# Patient Record
(demographics unavailable — no encounter records)

---

## 2025-01-28 NOTE — PHYSICAL EXAM
[Normal Sclera/Conjunctiva] : normal sclera/conjunctiva [EOMI] : extraocular movements intact [Normal] : normal rate, regular rhythm, normal S1 and S2 and no murmur heard [No Varicosities] : no varicosities [No Edema] : there was no peripheral edema [Soft] : abdomen soft [Non Tender] : non-tender [Non-distended] : non-distended [No HSM] : no HSM [Normal Bowel Sounds] : normal bowel sounds [Normal Posterior Cervical Nodes] : no posterior cervical lymphadenopathy [Normal Anterior Cervical Nodes] : no anterior cervical lymphadenopathy [Grossly Normal Strength/Tone] : grossly normal strength/tone [Coordination Grossly Intact] : coordination grossly intact [No Focal Deficits] : no focal deficits [Normal Gait] : normal gait [Normal Affect] : the affect was normal [Normal Insight/Judgement] : insight and judgment were intact [de-identified] : Do not appreciate significant lesion on back of neck as patient describes [de-identified] : noted area of darker pigmentation on R ankle;

## 2025-01-28 NOTE — HISTORY OF PRESENT ILLNESS
[FreeTextEntry1] : multiple complaints, CPE [de-identified] : Complains of a soft tissue lump on the back of his neck and tingling down his arm when he moves around at his desk  odd pressing sensation at the top of his head - mostly at night, and mostly when he smokes marijuana that are always in the same spot and go away after he relaxes  Has an area of darker pigmentation on R leg that has been present for many years; he is unsure if it has changed in character  He complains of tightness in chest, which is often relieved by belching or defecation  thinks he has sleep apnea - 2-3 times a year wakes up gasping for air  He has tinnitis in his R ear which does not particularly bother him  Had been on lexapro 6 years ago, now with significant mental health concerns

## 2025-01-28 NOTE — REVIEW OF SYSTEMS
[FreeTextEntry2] : as described in HPI [FreeTextEntry4] : as described in HPI [FreeTextEntry7] : as described in HPI [FreeTextEntry9] : as described in HPI [de-identified] : as described in HPI

## 2025-01-28 NOTE — REVIEW OF SYSTEMS
[FreeTextEntry2] : as described in HPI [FreeTextEntry4] : as described in HPI [FreeTextEntry7] : as described in HPI [FreeTextEntry9] : as described in HPI [de-identified] : as described in HPI

## 2025-01-28 NOTE — HEALTH RISK ASSESSMENT
[Yes] : Yes [2 - 4 times a month (2 pts)] : 2-4 times a month (2 points) [1 or 2 (0 pts)] : 1 or 2 (0 points) [Never (0 pts)] : Never (0 points) [2] : 1) Little interest or pleasure doing things for more than half of the days (2) [3] : 2) Feeling down, depressed, or hopeless for nearly every day (3) [PHQ-2 Negative - No further assessment needed] : PHQ-2 Negative - No further assessment needed [1/2 of Days or More (2)] : 1.) Little interest or pleasure in doing things? Half the days or more [Nearly Every Day (3)] : 7.) Trouble concentrating on things, such as reading a newspaper or watching television? Nearly every day [Not at All (0)] : 8.) Moving or speaking so slowly that other people could have noticed, or the opposite, moving or speaking faster than usual? Not at all [Several Days (1)] : 9.) Thoughts that you would be off dead or of hurting yourself in some way? Several days [Moderately Severe] : Severity of Depression is Moderately Severe [Not at all] : How difficult have these problems made it for you to do your work, take care of things at home, or get along with people? Not at all [PHQ-9 Positive] : PHQ-9 Positive [Former] : Former [Patient reported colonoscopy was normal] : Patient reported colonoscopy was normal [] :  [# Of Children ___] : has [unfilled] children [I have developed a follow-up plan documented below in the note.] : I have developed a follow-up plan documented below in the note. [Time Spent: ___ Minutes] : I spent [unfilled] minutes performing a depression screening for this patient. [Audit-CScore] : 2 [de-identified] : stationary bike 60 minutes 5-6 days a week [de-identified] : shops at [employer], eats whole foods [EEK8Edqot] : 0 [[id number]] : 16 [de-identified] : smoked veyr infrequently in late teens; now smokes marijuana intermittently [ColonoscopyDate] : 2023 [ColonoscopyComments] : polyp removed - unknown followup interval [FreeTextEntry2] : between jobs -

## 2025-01-28 NOTE — HISTORY OF PRESENT ILLNESS
[FreeTextEntry1] : multiple complaints, CPE [de-identified] : Complains of a soft tissue lump on the back of his neck and tingling down his arm when he moves around at his desk  odd pressing sensation at the top of his head - mostly at night, and mostly when he smokes marijuana that are always in the same spot and go away after he relaxes  Has an area of darker pigmentation on R leg that has been present for many years; he is unsure if it has changed in character  He complains of tightness in chest, which is often relieved by belching or defecation  thinks he has sleep apnea - 2-3 times a year wakes up gasping for air  He has tinnitis in his R ear which does not particularly bother him  Had been on lexapro 6 years ago, now with significant mental health concerns

## 2025-01-28 NOTE — HEALTH RISK ASSESSMENT
[Yes] : Yes [2 - 4 times a month (2 pts)] : 2-4 times a month (2 points) [1 or 2 (0 pts)] : 1 or 2 (0 points) [Never (0 pts)] : Never (0 points) [2] : 1) Little interest or pleasure doing things for more than half of the days (2) [3] : 2) Feeling down, depressed, or hopeless for nearly every day (3) [PHQ-2 Negative - No further assessment needed] : PHQ-2 Negative - No further assessment needed [1/2 of Days or More (2)] : 1.) Little interest or pleasure in doing things? Half the days or more [Nearly Every Day (3)] : 7.) Trouble concentrating on things, such as reading a newspaper or watching television? Nearly every day [Not at All (0)] : 8.) Moving or speaking so slowly that other people could have noticed, or the opposite, moving or speaking faster than usual? Not at all [Several Days (1)] : 9.) Thoughts that you would be off dead or of hurting yourself in some way? Several days [Moderately Severe] : Severity of Depression is Moderately Severe [Not at all] : How difficult have these problems made it for you to do your work, take care of things at home, or get along with people? Not at all [PHQ-9 Positive] : PHQ-9 Positive [Former] : Former [Patient reported colonoscopy was normal] : Patient reported colonoscopy was normal [] :  [# Of Children ___] : has [unfilled] children [I have developed a follow-up plan documented below in the note.] : I have developed a follow-up plan documented below in the note. [Time Spent: ___ Minutes] : I spent [unfilled] minutes performing a depression screening for this patient. [Audit-CScore] : 2 [de-identified] : stationary bike 60 minutes 5-6 days a week [de-identified] : shops at [employer], eats whole foods [AKE9Sstsb] : 0 [[id number]] : 16 [de-identified] : smoked veyr infrequently in late teens; now smokes marijuana intermittently [ColonoscopyDate] : 2023 [ColonoscopyComments] : polyp removed - unknown followup interval [FreeTextEntry2] : between jobs -

## 2025-01-28 NOTE — PHYSICAL EXAM
[Normal Sclera/Conjunctiva] : normal sclera/conjunctiva [EOMI] : extraocular movements intact [Normal] : normal rate, regular rhythm, normal S1 and S2 and no murmur heard [No Varicosities] : no varicosities [No Edema] : there was no peripheral edema [Soft] : abdomen soft [Non Tender] : non-tender [Non-distended] : non-distended [No HSM] : no HSM [Normal Bowel Sounds] : normal bowel sounds [Normal Posterior Cervical Nodes] : no posterior cervical lymphadenopathy [Normal Anterior Cervical Nodes] : no anterior cervical lymphadenopathy [Grossly Normal Strength/Tone] : grossly normal strength/tone [Coordination Grossly Intact] : coordination grossly intact [No Focal Deficits] : no focal deficits [Normal Gait] : normal gait [Normal Affect] : the affect was normal [Normal Insight/Judgement] : insight and judgment were intact [de-identified] : Do not appreciate significant lesion on back of neck as patient describes [de-identified] : noted area of darker pigmentation on R ankle;

## 2025-02-18 NOTE — HISTORY OF PRESENT ILLNESS
[Never] : never [TextBox_4] : 40 year old with recent diagnosis of HTN and family hx of JUNAID presenting for evaluation of possible JUNAID. States that he does snore and intermittently does feel tired during the day. He has noticed apnea symptoms a couple times. Denies headaches during the day and does at times feel tired during the day but not always. Mom was diagnosed with JUNAID but does not treat it. No frequent night awakens or difficulty concentrating. [ESS] : 11

## 2025-02-18 NOTE — ASSESSMENT
[FreeTextEntry1] : 40 year old with recent hx of HTN and family hx of JUNAID presenting for evaluation.   Data reviewed:   Snoring Daytime sleepiness HTN  Patient does have clinical signs and symptoms compatible with JUNAID. Discussed medical risk factors associated with moderate to severe JUNAID including HTN, CAD, stroke among others. Discussed treatment options including PAP therapy, oral appliance therapy, and inspire. Plan to obtain sleep study.  - Sleep study ordered

## 2025-03-01 NOTE — HISTORY OF PRESENT ILLNESS
[FreeTextEntry1] : NP - dark spots [de-identified] : Casey Rosas 41y/o M presents for # dark spot on rt ankle -present for >20 years per patient, doesn't think changing, asx -family encouraged him to get evaluated   #bumps on back and posterior head -come and go, has been occurring over last year -mostly asx -has applied OTC anti-biotic to them in the past, unsure if helps  PHx of skin cancer: no FHx of skin cancer: no H/x of blistering sunburns: no H/x of tanning bed use: no Uses sunscreen regularly: yes

## 2025-03-01 NOTE — HISTORY OF PRESENT ILLNESS
11/24/2021    HRA (Health Risk Assessment) done, reviewed with patient and documented in the chart.    HISTORY OF PRESENT ILLNESS:  Baldemar Bashir presents for his Annual Wellness VisitF/U hyperlipidemia, HTN, prostate cancer diagnosed this year. No treatments at this time.    Has had the COVID and influenza vaccines    he  has complaints or concerns which include chronic left foot pain that radiates into the left medial calf.  Fractured his ankle greater than 1 year ago, continues to have intermittent discomfort.  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  Initially had some relief, but states he now has chronic discomfort.  Does not note erythema or edema.  No paresthesias.    No chest pain, no palpitations, no dyspnea..    No opiod use    PROVIDERS:   STALIN Weir, APC PCP  GENEVA Machuca, Podiatry  Dr. TITO Luu, Urology  Dr. MIMI Zelaya, Cardiology  Dr. SIMEON Bocanegra, DDS  Dr. SIMEON Morales. Optometry      Weight is down 10 #  VITAL SIGNS  Visit Vitals  /84 (BP Location: LUE - Left upper extremity, Patient Position: Sitting, Cuff Size: Large Adult)   Pulse 94   Ht 6' (1.829 m)   Wt 110.2 kg (243 lb)   BMI 32.96 kg/m²     110.2 kg (243 lb)  6' (1.829 m)  Body mass index is 32.96 kg/m².    HEARING AND VISION:   Hearing Screening    125Hz 250Hz 500Hz 1000Hz 2000Hz 3000Hz 4000Hz 6000Hz 8000Hz   Right ear:            Left ear:            Comments: Bilat finger test passed     Visual Acuity Screening    Right eye Left eye Both eyes   Without correction:   20 30   With correction:          RISK FACTORS:  · Depression:  denies  · Functional Ability: no cane nor walker  · Level Of Safety including hearing impairment: does not feel he has any significant hearing loss  · ADL's (Activities of Daily Living): Independent , does drive   · Falls:  Fell Feb 2021 on ice , no falls since  · Home:  2 story home, with his wife, able to maintain his home    PROBLEM LIST:  Patient Active Problem List   Diagnosis   • Mixed  [FreeTextEntry1] : NP - dark spots hyperlipidemia   • Essential hypertension   • Prediabetes   • Abnormal EKG   • GERD (gastroesophageal reflux disease)   • Asymptomatic PVCs   • Obesity (BMI 30-39.9)   • Elevated PSA       PAST MEDICAL HISTORY:      Hypertension                                                  Elevated blood sugar                                          High cholesterol                                              Elevated PSA                                                  SURGICAL HISTORY:       FOOT FRACTURE SURGERY                           02/28/2018      Comment: ORIF left fibula fracture Dr Maykel Machuca    MEDICATIONS:  Current Outpatient Medications   Medication Sig   • atorvastatin (LIPITOR) 80 MG tablet Take 1 tablet by mouth daily.   • hydrochlorothiazide (MICROZIDE) 12.5 MG capsule Take 1 capsule by mouth daily.   • losartan (COZAAR) 50 MG tablet Take 2 tablets by mouth daily. Indications: High Blood Pressure Disorder   • VITAMIN D PO      No current facility-administered medications for this visit.       [facility] [place]    ALLERGIES   ALLERGIES:   Allergen Reactions   • Grass Other (See Comments)     Seasonal allergies   • Simvastatin MYALGIA       FAMILY HISTORY:    Family History   Problem Relation Age of Onset   • High blood pressure Father    • Cancer Father 50        Bladder, bone. Dead 80's   • Other Mother         Heart disease, dead 52   • Patient is unaware of any medical problems Sister    • Patient is unaware of any medical problems Brother    • Hypertension Brother    • Patient is unaware of any medical problems Daughter    • Patient is unaware of any medical problems Son    • Patient is unaware of any medical problems Son    • Patient is unaware of any medical problems Son         HEALTH CARE MAINTENANCE:  Immunizations, screening tests, and routine follow-up testing related to the patient medical history were reviewed in detail.  Needed items are as per  [de-identified] : Casey Rosas 39y/o M presents for # dark spot on rt ankle -present for >20 years per patient, doesn't think changing, asx -family encouraged him to get evaluated   #bumps on back and posterior head -come and go, has been occurring over last year -mostly asx -has applied OTC anti-biotic to them in the past, unsure if helps  PHx of skin cancer: no FHx of skin cancer: no H/x of blistering sunburns: no H/x of tanning bed use: no Uses sunscreen regularly: yes the order summary.    SOCIAL HISTORY:  Social History     Socioeconomic History   • Marital status: /Civil Union     Spouse name: Yadi   • Number of children: 4   • Years of education: Not on file   • Highest education level: Not on file   Occupational History   • Occupation: , Retired     Comment: [employer]   Tobacco Use   • Smoking status: Never Smoker   • Smokeless tobacco: Never Used   Substance and Sexual Activity   • Alcohol use: Yes     Alcohol/week: 1.0 standard drink     Types: 1 Cans of beer per week   • Drug use: No   • Sexual activity: Yes     Partners: Female   Other Topics Concern   •  Service Yes     Comment: [employer] [id number]   • Blood Transfusions No   • Caffeine Concern No   • Occupational Exposure Not Asked   • Hobby Hazards Not Asked   • Sleep Concern Not Asked   • Stress Concern Not Asked   • Weight Concern Not Asked   • Special Diet Not Asked   • Back Care Not Asked   • Exercise Yes     Comment: exercise 5-6 days a week   • Bike Helmet Not Asked   • Seat Belt Yes   • Self-Exams Not Asked   Social History Narrative   • Not on file     Social Determinants of Health     Financial Resource Strain:    • Social Determinants: Financial Resource Strain: Not on file   Food Insecurity:    • Social Determinants: Food Insecurity: Not on file   Transportation Needs:    • Lack of Transportation (Medical): Not on file   • Lack of Transportation (Non-Medical): Not on file   Physical Activity:    • Days of Exercise per Week: Not on file   • Minutes of Exercise per Session: Not on file   Stress:    • Social Determinants: Stress: Not on file   Social Connections:    • Social Determinants: Social Connections: Not on file   Intimate Partner Violence:    • Social Determinants: Intimate Partner Violence Past Fear: Not on file   • Social Determinants: Intimate Partner Violence Current Fear: Not on file       DRUG HISTORY   reports no history of drug use.    Questionnaire as  completed above addressing hearing , Depression , ADLs , Functional Ability, Home Safety reviewed at length.     Diet is reviewed with the patient and reveals: he tries to follow an appropriate diet on a consistent basis.    End of Life:he does not  have a HCPOA (Health Care Power-of-) completed and in place.     Exercise:  is exercising regularly.    REVIEW OF SYSTEMS: The patient denies symptoms of:     General: fever, chills, night sweats, fatigue, weight loss, weight gain and decreased appetite  Skin: rash, itching, lumps or bumps or moles that are changing, hair changes and nail changes  Eyes: visual blurring, double vision, spots before the eyes and eye pain  Ears: decreased auditory acuity, ringing or tinnitus in the ears, pain in the ears and discharge from the ears  Nose: nose bleed, discharge from the nose and decrease in ability to smell  Mouth: soreness of the mouth or tongue or lips and abnormality of the teeth or gums  Throat: sore throat and hoarseness or voice change. No dysphagia.  Neck: stiffness or pain in the neck  Cardiorespiratory: chest pain, edema, cough, hemoptysis, wheeze and shortness of breath  Gastrointestinal: abdominal pain, nausea, vomiting, constipation, diarrhea, black tarry stools, blood in the stools, change in the bowel habits, or heartburn and indigestion  Genitourinary: urgency, frequency, dysuria, hematuria and nocturia  No change in urinary stream.  Neurologic: headache, weakness, loss of sensation, visual disturbance, trouble with balance or coordination and loss of memory  Musculoskeletal:  Positive for left foot pain is noted above  Psychiatric: symptoms of depression, feeling sad or blue. No anxiety.  Breasts: No breast masses, no dimpling or puckering. No nipple discharge.    1.) Do you have an Advance directive, living will, or power of  for health care document that contains your wishes for end of life care?: No     6 a.) How many servings of Fruits and  Vegetables do you have each day ( 1 serving = 1 piece of fruit, 1/2 cup fruits or vegetables): 1 per day     6 b.) How many servings of High Fiber / Whole Grain Foods to you have each day ( 1 serving = 1 cup cold cereal, 1/2 cup cooked cereal, 1 slice bread): 1 per day     6 d.) How many servings of Sugar Sweetened Beverages do you have each day ( 1 serving = 1 can or 12 oz cup of sode or juice): 2 per day     7a.) Have you had a fall in the past year?: Yes     10.) How often do you have trouble taking medicines the way you have been told to take them?: Sometimes I take my prescribed medications         PHYSICAL EXAMINATION:    Constitutional:  Well-developed, well-nourished, no acute distress, non-toxic appearance.   Eyes:  PERRL (Pupils equal, round, reactive to light), conjunctivae normal.  HENT:  PERRL, external ears normal, nose normal, oropharynx moist, no pharyngeal exudates. Neck- normal range of motion, no tenderness, supple.  Normal dentition.   Neck: Normal range of motion. No tenderness. Supple. No stridor.  No carotid bruits on auscultation. No thyromegaly, no masses palpated.  Respiratory:  No respiratory distress, normal breath sounds, no rales, no wheezing.   Cardiovascular:  Normal rate, normal rhythm, no murmurs, no gallops, no rubs.   Gastrointestinal:  Soft, non-distended, normal bowel sounds, nontender, no organomegaly, no mass, no rebound, no guarding.  Genitourinary:  No costovertebral angle tenderness.  Musculoskeletal:  No edema, no tenderness, no deformities. Back- no tenderness.  Integument:  Well-hydrated, no rash, no unusual moles.  Lymphatic:  No lymphadenopathy noted.   Neurologic:  Alert and oriented times 3, CN (Cranial nerves) 2-12 normal, normal motor function, normal sensory function, no focal deficits noted.   Psychiatric:  Speech and behavior appropriate. No short term or long term memory deficits  Genitalia: Normal male external genitalia.  No rashes or lesions. No hernias.  Penis is circumcised. Descended bilaterally, no testicular masses tolerated.   Anus: Normal sphincter tone. Normal prostate. No masses palpated.   Breast: Symmetric. No dimpling or puckering. No nipple discharge. No masses.    Patient Active Problem List   Component Date Value Ref Range Status   • Case Report 04/06/2021    Final                    Value:Surgical Pathology                                Case: [id number]                                 Authorizing Provider:  Jacky Macias MD          Collected:           04/06/2021 1226              Ordering Location:     [facility]  Received:            04/06/2021 1517              Pathologist:           Bruce Hartman MD                                                     Specimens:   A) - Prostate, Right Lateral Base; 2 specimens per jar                                              B) - Prostate, Right Lateral Mid; 2 specimens per jar                                               C) - Prostate, Right Lateral [place]; 2 specimens per jar                                              D) - Prostate, Left Lateral Base; 2 specimens per jar                                               E) - Prostate, Left Lateral Mid; 2 specimens per jar                                                F) - Prostate, Left Lateral [place]; 2 specimens per jar                                     • Pathologic Diagnosis 04/06/2021    Final                    Value:This result contains rich text formatting which cannot be displayed here.   • Clinical Information 04/06/2021    Final                    Value:This result contains rich text formatting which cannot be displayed here.   • Gross Description 04/06/2021    Final                    Value:This result contains rich text formatting which cannot be displayed here.   • Microscopic Description 04/06/2021    Final                    Value:This result contains rich text formatting which cannot be displayed here.   • Disclaimer  04/06/2021    Final                    Value:This result contains rich text formatting which cannot be displayed here.       ASSESSMENT:     ED Diagnosis   1. Welcome to Medicare preventive visit     2. Mixed hyperlipidemia  LIPID PANEL WITH REFLEX   3. Essential hypertension  CBC WITH DIFFERENTIAL    COMPREHENSIVE METABOLIC PANEL   4. Prediabetes  GLYCOHEMOGLOBIN   5. Obesity (BMI 30-39.9)     6. Elevated PSA  PSA   7. Chronic pain in left foot  XR FOOT 3+ VW LEFT    URIC ACID   8. Need for vaccination  PNEUMOCOCCAL POLYSACCHARIDE ADULT VACC, IM/SQ (PNEUMOVAX 23)   Plan 1. Preventive issues reviewed.  Continue to exercise on a regular basis.    Encouraged to follow through with the Shingrix vaccine.  2. Labs pending, once I have the results further recommendations will be made.  Atorvastatin 80 mg daily.  Repeat lipids 1 year   3. Losartan 50 mg 2 tablets a day, hydrochlorothiazide 12.5 mg a day.  Limit salt intake.  Regular exercise.  Encouraged to check his blood pressure on occasion.  4. Lab work is pending.  Congratulated on his weight loss.  Encouraged to limit carbohydrates.  Exercise routinely  5. His goal is to lose another 10 lb  6. Continue to follow with urology  7. X-rays pending, uric acid level pending.  Once I have the results will discuss further recommendation, discussed referral back to Podiatry.      Body mass index is 32.96 kg/m².    BMI ASSESSMENT/PLAN:  Patient is obese.    See patient education in AVS         Recent Review Flowsheet Data     Date 11/22/2021    Adult PHQ 2 Score 0    Adult PHQ 2 Interpretation No further screening needed    Little interest or pleasure in activity? 0    Feeling down, depressed or hopeless? 0       View Complete Flowsheet ...More Data Exists          DEPRESSION ASSESSMENT/PLAN:  Depression screening is negative no further plan needed.

## 2025-03-01 NOTE — ASSESSMENT
[FreeTextEntry1] : #Dermatitis, leaving behind #scarring -New diagnosis, unc prognosis -On back, concern for autoimmune blistering like p foliaceous or SCLE/DLE given scarring left behind -Disc can mix clobetasol with mupirocin but will defer until bx results, biopsies as below  # Neoplasm of uncertain behavior R mid back x2 for H&E and DIF Location: R mid back DDx: R/o pemphigus foliaceous vs SCLE/DLE vs other Biopsy by 4 mm punch  The risks/benefits/alternatives of skin biopsy were explained to the patient, which include and are not limited to bleeding, infection, scarring or discoloration of skin, and recurrence of lesion. Patient expressed understanding of these risks and provided consent to the procedure. Time out with verification of patient and lesion site was performed. Site was prepped with rubbing alcohol, lidocaine with epinephrine was injected for anesthesia, and biopsy was performed. Specimen sent to path.  Procedure was without complication and well tolerated. Wound care was discussed.  f/u in 2 weeks for suture removal  # Neoplasm of uncertain behavior Location: Right lower leg DDx: PIH/hemosiderin deposition vs less likely Kaposi vs other Biopsy by 4 mm punch  The risks/benefits/alternatives of skin biopsy were explained to the patient, which include and are not limited to bleeding, infection, scarring or discoloration of skin, and recurrence of lesion. Patient expressed understanding of these risks and provided consent to the procedure. Time out with verification of patient and lesion site was performed. Site was prepped with rubbing alcohol, lidocaine with epinephrine was injected for anesthesia, and biopsy was performed. Specimen sent to path.  Procedure was without complication and well tolerated. Wound care was discussed.  f/u in 2 weeks for suture removal

## 2025-03-01 NOTE — PHYSICAL EXAM
[FreeTextEntry3] : Hyperpigmented discontiguous patch R lower leg Several round scars on back, one eroded round patch with overlying flaky scale Hyperpigmented round patch left nasal ala

## 2025-03-17 NOTE — ASSESSMENT
[FreeTextEntry1] : #Dermatitis, leaving behind scarring -New diagnosis, unc prognosis -Bx last visit showing ulcer likely 2/2 excoriation, DIF negative -Pt to RTC when flaring without scratching for re-biopsy when active  #PIH R ankle -Supported by bx, reassurance, RTC for any changes  RTC prn

## 2025-03-17 NOTE — HISTORY OF PRESENT ILLNESS
[FreeTextEntry1] : RP - dark spots [de-identified] : Casey Rosas 39y/o M presents for # dark spot on rt ankle -present for >20 years per patient, doesn't think changing, asx -bx consistent with PIH  #bumps on back and posterior head -come and go, has been occurring over last year -mostly asx -has applied OTC anti-biotic to them in the past, unsure if helps -bx showing ulcer and epidermal necrosis 2/2 excoriation, DIF neg  PHx of skin cancer: no FHx of skin cancer: no H/x of blistering sunburns: no H/x of tanning bed use: no Uses sunscreen regularly: yes

## 2025-03-17 NOTE — HISTORY OF PRESENT ILLNESS
[FreeTextEntry1] : RP - dark spots [de-identified] : Casey Rosas 39y/o M presents for # dark spot on rt ankle -present for >20 years per patient, doesn't think changing, asx -bx consistent with PIH  #bumps on back and posterior head -come and go, has been occurring over last year -mostly asx -has applied OTC anti-biotic to them in the past, unsure if helps -bx showing ulcer and epidermal necrosis 2/2 excoriation, DIF neg  PHx of skin cancer: no FHx of skin cancer: no H/x of blistering sunburns: no H/x of tanning bed use: no Uses sunscreen regularly: yes

## 2025-04-14 NOTE — HISTORY OF PRESENT ILLNESS
[FreeTextEntry1] : f/u  [de-identified] : had biopsies at dermatology  started [facility] for therapy - weekly appointments, which have been helping, but he is still very nervous/anxious and he would like to go back on lexapro  accepted new job which has improved his mental health.  Has been using omeprazole, which has improved the constant chest discomfort and belching, but he still experiences some intermittent SOB, especially when climbing stairs, and shooting discomfort in his neck/left arm which is concerning to him.

## 2025-04-14 NOTE — PHYSICAL EXAM
[Normal Sclera/Conjunctiva] : normal sclera/conjunctiva [EOMI] : extraocular movements intact [Normal] : normal rate, regular rhythm, normal S1 and S2 and no murmur heard [Coordination Grossly Intact] : coordination grossly intact [No Focal Deficits] : no focal deficits [Normal Gait] : normal gait [Normal Affect] : the affect was normal [Normal Insight/Judgement] : insight and judgment were intact Impaired gait

## 2025-06-23 NOTE — HISTORY OF PRESENT ILLNESS
[de-identified] : CC: tinnitus    HISTORY OF PRESENT ILLNESS: SARKIS MATUTE is a 41 year old male who presents today with tinnitus x many years referred by his PCP Dr. Rubi he believes he has had R sided tinnitus since he was a child. it is exacerbated by loud places. he also reports frequent ear popping and muffled hearing this is his first time seeking treatment for it  denies otalgia, otorrhea, dizziness, bruxism, clenching, allergies, nasal symptoms  he has a history of depression - currently on escitalopram    REVIEW OF SYSTEMS: General ROS: negative for - chills, fatigue or fever Psychological ROS: negative for - anxiety or depression Ophthalmic ROS: negative for - blurry vision, decreased vision or double vision ENT ROS: negative except as noted from HPI Allergy and Immunology ROS: negative except as noted from HPI Hematological and Lymphatic ROS: negative for - bleeding problems Endocrine ROS: negative for - malaise/lethargy Respiratory ROS: negative for - stridor Cardiovascular ROS: negative for - chest pain Gastrointestinal ROS: negative for - appetite loss or nausea/vomiting Genitourinary ROS: negative for - incontinence Musculoskeletal ROS: negative for - gait disturbance Neurological ROS: negative for - behavioral changes Dermatological ROS: negative for - nail changes   Physical Exam:   GENERAL APPEARANCE: Well-developed and No Acute Distress. COMMUNICATION: Able to Communicate. Strong Voice.   HEAD AND FACE Eyes: Testing of ocular motility including primary gaze alignment normal. Inspection and Appearance: No evidence of lesions or masses Palpation: Palpation of the face reveals no sinus tenderness Salivary Glands: Symmetric without masses Facial Strength: Symmetric without evidence of facial paralysis   EAR, NOSE, MOUTH, and THROAT: Ear Canals and Tympanic Membranes, Bilateral: No evidence of inflammation or lesions. Thresholds: Clinical speech reception thresholds normal. External, Nose and Auricle: No lesions or masses. Nasal, Mucosa, Septum, and Turbinates: See endoscopy.   NECK: Evaluation: No evidence of masses or crepitus. The neck is symmetric and the trachea is in the midline position. Thyroid: No evidence of enlargement, tenderness or mass. Neck Lymph Nodes: WNL. Respiratory: Inspection of the chest including symmetry, expansion and/or assessment of respiratory effort normal. Cardiovascular: Evaluation of peripheral vascular system by observation and palpation of capillary refill, normal. Neurological/Psychiatric: Alert, Oriented, Mood, and Affect Normal.     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

## 2025-06-23 NOTE — HISTORY OF PRESENT ILLNESS
[de-identified] : CC: tinnitus    HISTORY OF PRESENT ILLNESS: SARKIS MATUTE is a 41 year old male who presents today with tinnitus x many years referred by his PCP Dr. Rubi he believes he has had R sided tinnitus since he was a child. it is exacerbated by loud places. he also reports frequent ear popping and muffled hearing this is his first time seeking treatment for it  denies otalgia, otorrhea, dizziness, bruxism, clenching, allergies, nasal symptoms  he has a history of depression - currently on escitalopram    REVIEW OF SYSTEMS: General ROS: negative for - chills, fatigue or fever Psychological ROS: negative for - anxiety or depression Ophthalmic ROS: negative for - blurry vision, decreased vision or double vision ENT ROS: negative except as noted from HPI Allergy and Immunology ROS: negative except as noted from HPI Hematological and Lymphatic ROS: negative for - bleeding problems Endocrine ROS: negative for - malaise/lethargy Respiratory ROS: negative for - stridor Cardiovascular ROS: negative for - chest pain Gastrointestinal ROS: negative for - appetite loss or nausea/vomiting Genitourinary ROS: negative for - incontinence Musculoskeletal ROS: negative for - gait disturbance Neurological ROS: negative for - behavioral changes Dermatological ROS: negative for - nail changes   Physical Exam:   GENERAL APPEARANCE: Well-developed and No Acute Distress. COMMUNICATION: Able to Communicate. Strong Voice.   HEAD AND FACE Eyes: Testing of ocular motility including primary gaze alignment normal. Inspection and Appearance: No evidence of lesions or masses Palpation: Palpation of the face reveals no sinus tenderness Salivary Glands: Symmetric without masses Facial Strength: Symmetric without evidence of facial paralysis   EAR, NOSE, MOUTH, and THROAT: Ear Canals and Tympanic Membranes, Bilateral: No evidence of inflammation or lesions. Thresholds: Clinical speech reception thresholds normal. External, Nose and Auricle: No lesions or masses. Nasal, Mucosa, Septum, and Turbinates: See endoscopy.   NECK: Evaluation: No evidence of masses or crepitus. The neck is symmetric and the trachea is in the midline position. Thyroid: No evidence of enlargement, tenderness or mass. Neck Lymph Nodes: WNL. Respiratory: Inspection of the chest including symmetry, expansion and/or assessment of respiratory effort normal. Cardiovascular: Evaluation of peripheral vascular system by observation and palpation of capillary refill, normal. Neurological/Psychiatric: Alert, Oriented, Mood, and Affect Normal.     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